# Patient Record
Sex: FEMALE | Race: ASIAN | NOT HISPANIC OR LATINO | URBAN - METROPOLITAN AREA
[De-identification: names, ages, dates, MRNs, and addresses within clinical notes are randomized per-mention and may not be internally consistent; named-entity substitution may affect disease eponyms.]

---

## 2017-12-30 ENCOUNTER — EMERGENCY (EMERGENCY)
Facility: HOSPITAL | Age: 57
LOS: 1 days | Discharge: HOME | End: 2017-12-30
Attending: EMERGENCY MEDICINE | Admitting: EMERGENCY MEDICINE
Payer: MEDICAID

## 2017-12-30 VITALS
DIASTOLIC BLOOD PRESSURE: 84 MMHG | WEIGHT: 154.98 LBS | RESPIRATION RATE: 16 BRPM | SYSTOLIC BLOOD PRESSURE: 157 MMHG | HEIGHT: 60 IN | OXYGEN SATURATION: 97 % | TEMPERATURE: 98 F | HEART RATE: 82 BPM

## 2017-12-30 DIAGNOSIS — M06.9 RHEUMATOID ARTHRITIS, UNSPECIFIED: ICD-10-CM

## 2017-12-30 DIAGNOSIS — J20.9 ACUTE BRONCHITIS, UNSPECIFIED: ICD-10-CM

## 2017-12-30 DIAGNOSIS — J22 UNSPECIFIED ACUTE LOWER RESPIRATORY INFECTION: ICD-10-CM

## 2017-12-30 LAB
ANION GAP SERPL CALC-SCNC: 7 MMOL/L — SIGNIFICANT CHANGE UP (ref 5–17)
BUN SERPL-MCNC: 7 MG/DL — SIGNIFICANT CHANGE UP (ref 7–23)
CALCIUM SERPL-MCNC: 8.6 MG/DL — SIGNIFICANT CHANGE UP (ref 8.5–10.1)
CHLORIDE SERPL-SCNC: 104 MMOL/L — SIGNIFICANT CHANGE UP (ref 96–108)
CO2 SERPL-SCNC: 27 MMOL/L — SIGNIFICANT CHANGE UP (ref 22–31)
CREAT SERPL-MCNC: 0.81 MG/DL — SIGNIFICANT CHANGE UP (ref 0.5–1.3)
CRP SERPL-MCNC: 2.5 MG/DL — HIGH (ref 0–0.4)
FLUAV H3 RNA SPEC QL NAA+PROBE: DETECTED
GLUCOSE SERPL-MCNC: 103 MG/DL — HIGH (ref 70–99)
HCT VFR BLD CALC: 29.9 % — LOW (ref 34.5–45)
HGB BLD-MCNC: 9.1 G/DL — LOW (ref 11.5–15.5)
MCHC RBC-ENTMCNC: 20.5 PG — LOW (ref 27–34)
MCHC RBC-ENTMCNC: 30.4 GM/DL — LOW (ref 32–36)
MCV RBC AUTO: 67.4 FL — LOW (ref 80–100)
PLATELET # BLD AUTO: 292 K/UL — SIGNIFICANT CHANGE UP (ref 150–400)
POTASSIUM SERPL-MCNC: 4 MMOL/L — SIGNIFICANT CHANGE UP (ref 3.5–5.3)
POTASSIUM SERPL-SCNC: 4 MMOL/L — SIGNIFICANT CHANGE UP (ref 3.5–5.3)
RAPID RVP RESULT: DETECTED
RBC # BLD: 4.44 M/UL — SIGNIFICANT CHANGE UP (ref 3.8–5.2)
RBC # FLD: 14.1 % — SIGNIFICANT CHANGE UP (ref 10.3–14.5)
SODIUM SERPL-SCNC: 138 MMOL/L — SIGNIFICANT CHANGE UP (ref 135–145)
WBC # BLD: 14 K/UL — HIGH (ref 3.8–10.5)
WBC # FLD AUTO: 14 K/UL — HIGH (ref 3.8–10.5)

## 2017-12-30 PROCEDURE — 87486 CHLMYD PNEUM DNA AMP PROBE: CPT

## 2017-12-30 PROCEDURE — 71046 X-RAY EXAM CHEST 2 VIEWS: CPT

## 2017-12-30 PROCEDURE — 71020: CPT | Mod: 26

## 2017-12-30 PROCEDURE — 85027 COMPLETE CBC AUTOMATED: CPT

## 2017-12-30 PROCEDURE — 94640 AIRWAY INHALATION TREATMENT: CPT

## 2017-12-30 PROCEDURE — 99285 EMERGENCY DEPT VISIT HI MDM: CPT

## 2017-12-30 PROCEDURE — 87581 M.PNEUMON DNA AMP PROBE: CPT

## 2017-12-30 PROCEDURE — 84145 PROCALCITONIN (PCT): CPT

## 2017-12-30 PROCEDURE — 87633 RESP VIRUS 12-25 TARGETS: CPT

## 2017-12-30 PROCEDURE — 86140 C-REACTIVE PROTEIN: CPT

## 2017-12-30 PROCEDURE — 80048 BASIC METABOLIC PNL TOTAL CA: CPT

## 2017-12-30 PROCEDURE — 36415 COLL VENOUS BLD VENIPUNCTURE: CPT

## 2017-12-30 PROCEDURE — 87798 DETECT AGENT NOS DNA AMP: CPT

## 2017-12-30 PROCEDURE — 99285 EMERGENCY DEPT VISIT HI MDM: CPT | Mod: 25

## 2017-12-30 RX ORDER — ALBUTEROL 90 UG/1
2 AEROSOL, METERED ORAL
Qty: 0 | Refills: 0 | COMMUNITY

## 2017-12-30 RX ORDER — ALBUTEROL 90 UG/1
2 AEROSOL, METERED ORAL
Qty: 1 | Refills: 0 | OUTPATIENT
Start: 2017-12-30 | End: 2018-01-03

## 2017-12-30 RX ORDER — SULFASALAZINE 500 MG
3 TABLET ORAL
Qty: 0 | Refills: 0 | COMMUNITY

## 2017-12-30 RX ORDER — ATORVASTATIN CALCIUM 80 MG/1
1 TABLET, FILM COATED ORAL
Qty: 0 | Refills: 0 | COMMUNITY

## 2017-12-30 RX ORDER — BUDESONIDE AND FORMOTEROL FUMARATE DIHYDRATE 160; 4.5 UG/1; UG/1
2 AEROSOL RESPIRATORY (INHALATION)
Qty: 0 | Refills: 0 | COMMUNITY

## 2017-12-30 RX ORDER — SULFASALAZINE 500 MG
6 TABLET ORAL
Qty: 0 | Refills: 0 | COMMUNITY

## 2017-12-30 RX ORDER — ALBUTEROL 90 UG/1
2.5 AEROSOL, METERED ORAL
Qty: 0 | Refills: 0 | Status: COMPLETED | OUTPATIENT
Start: 2017-12-30 | End: 2017-12-30

## 2017-12-30 RX ORDER — LISINOPRIL 2.5 MG/1
1 TABLET ORAL
Qty: 0 | Refills: 0 | COMMUNITY

## 2017-12-30 RX ADMIN — ALBUTEROL 2.5 MILLIGRAM(S): 90 AEROSOL, METERED ORAL at 09:56

## 2017-12-30 RX ADMIN — Medication 40 MILLIGRAM(S): at 12:09

## 2017-12-30 RX ADMIN — ALBUTEROL 2.5 MILLIGRAM(S): 90 AEROSOL, METERED ORAL at 10:22

## 2017-12-30 RX ADMIN — ALBUTEROL 2.5 MILLIGRAM(S): 90 AEROSOL, METERED ORAL at 10:13

## 2017-12-30 NOTE — ED ADULT TRIAGE NOTE - CHIEF COMPLAINT QUOTE
"I've had a dry cough for 2 weeks. I got back from [place] 2 days ago. I've had a fever of up to 102F and some pain in my chest when I cough"

## 2017-12-30 NOTE — ED PROVIDER NOTE - OBJECTIVE STATEMENT
56 yo female, visited [place] x few weeks and presents today with dark greenish/black sputum/cough x 2-weeks which began while there and which is still present today. No fever/chills/nausea/vomiting/diarrhea/SOB or chest pain.

## 2017-12-30 NOTE — CONSULT NOTE ADULT - PROBLEM SELECTOR RECOMMENDATION 2
acute bronchospasm related to LRTI  proventil PRN  Prednisone 40 mg PO daily x 5 days, then cont with 5 mg PO as per home regimen  Symbicort 160/4.5 mg BID inhaler  Mucinex BID  complete ABX therapy  will need follow up with PMD  reviewed LABS, CXR and Impression with pt and family

## 2017-12-30 NOTE — ED PROVIDER NOTE - PROGRESS NOTE DETAILS
Feels better at this time. Seen and evaluated by Dr. Kim Feels better at this time. Seen and evaluated by Dr. Kim. Patient aware of risks of steroids and quinolones including tendon rupture and agrees with plan and accepts risks

## 2017-12-30 NOTE — CONSULT NOTE ADULT - PROBLEM SELECTOR RECOMMENDATION 3
RA  home regimen  follow up with PMD  caution with Systemic Steroids and Quinolones, limit strenuous activity to avoid tendinopathy and or cartilaginous injuries

## 2017-12-30 NOTE — CONSULT NOTE ADULT - SUBJECTIVE AND OBJECTIVE BOX
Date/Time Patient Seen:  		  Referring MD:   Data Reviewed	       Patient is a 57y old  Female who presents with a chief complaint of sob, cough, wheezing    Subjective/HPI  in bed  seen and examined, vs and meds reviewed, cough and sob and wheezing reported, pt recently traveled from [place], now with lower resp tract infection symptoms  56 yo female, visited [place] x few weeks and presents today with dark greenish/black sputum/cough x 2-weeks which began while there and which is still present today. No fever/chills/nausea/vomiting/diarrhea/SOB or chest pain.    pt reports productive cough, green phlegm, fever    pt has RA, currently on regimen as per PMD and Rheum, was on Humira 5 months ago, maintained on low dose prednisone and sulfasalazine        PAST MEDICAL & SURGICAL HISTORY:  HLD (hyperlipidemia)  Rheumatoid arthritis  Hypertension  No significant past surgical history        Medication list         MEDICATIONS  (STANDING):  levoFLOXacin  Tablet 500 milliGRAM(s) Oral once  predniSONE   Tablet 40 milliGRAM(s) Oral Once    MEDICATIONS  (PRN):         Vitals log        ICU Vital Signs Last 24 Hrs  T(C): 36.8 (30 Dec 2017 09:27), Max: 36.8 (30 Dec 2017 09:27)  T(F): 98.3 (30 Dec 2017 09:27), Max: 98.3 (30 Dec 2017 09:27)  HR: 82 (30 Dec 2017 09:27) (82 - 82)  BP: 157/84 (30 Dec 2017 09:27) (157/84 - 157/84)  BP(mean): --  ABP: --  ABP(mean): --  RR: 16 (30 Dec 2017 09:27) (16 - 16)  SpO2: 97% (30 Dec 2017 09:27) (97% - 97%)           Input and Output:  I&O's Detail      Lab Data                        9.1    14.0  )-----------( 292      ( 30 Dec 2017 10:22 )             29.9     12-30    138  |  104  |  7   ----------------------------<  103<H>  4.0   |  27  |  0.81    Ca    8.6      30 Dec 2017 10:22    non smoker  non drinker  lives at home  recent travel reported to [facility] and sick contacts reported            Review of Systems	  cough  sob  wheezing  productive cough    Objective     Physical Examination    obese  head at  heart s1s2  lungs dec BS  wheezing  cn grossly int  moves all extr      Pertinent Lab findings & Imaging      Neumann:  NO   Adequate UO     I&O's Detail           Discussed with:     Cultures:	        Radiology    EXAM:  CHEST PA & LAT                            PROCEDURE DATE:  12/30/2017          INTERPRETATION:  Clinical information: Cough    No prior studies present for comparison    2 views, frontal and lateral.    The aorta shows atherosclerotic changes. The lungs are clear. There is no   sign of infiltrate effusion or congestive failure. Heart size is within   normal limits. Round appearance of the right pulmonary artery, correlate   with prior study or a follow-up exam to ensure stability. Mediastinal   contours, osseous structures intact.    The bony thorax appears intact.    IMPRESSION:    See above report.                PERCY GAMEZ M.D.,ATTENDING RADIOLOGIST  This document has been electronically signed. Dec 30 2017 10:32AM

## 2021-11-22 ENCOUNTER — OUT OF OFFICE VISIT (OUTPATIENT)
Dept: URBAN - METROPOLITAN AREA MEDICAL CENTER 12 | Facility: MEDICAL CENTER | Age: 61
End: 2021-11-22
Payer: COMMERCIAL

## 2021-11-22 DIAGNOSIS — I25.810 ATHEROSCLEROSIS OF CORONARY ARTERY BYPASS GRAFT(S) WITHOUT ANGINA PECTORIS: ICD-10-CM

## 2021-11-22 DIAGNOSIS — K59.09 CHANGE IN BOWEL MOVEMENTS INTERMITTENT CONSTIPATION. URGENCY IN THE MORNING.: ICD-10-CM

## 2021-11-22 PROCEDURE — 99232 SBSQ HOSP IP/OBS MODERATE 35: CPT | Performed by: STUDENT IN AN ORGANIZED HEALTH CARE EDUCATION/TRAINING PROGRAM

## 2021-11-22 PROCEDURE — G8427 DOCREV CUR MEDS BY ELIG CLIN: HCPCS | Performed by: STUDENT IN AN ORGANIZED HEALTH CARE EDUCATION/TRAINING PROGRAM

## 2021-11-22 PROCEDURE — 99222 1ST HOSP IP/OBS MODERATE 55: CPT | Performed by: STUDENT IN AN ORGANIZED HEALTH CARE EDUCATION/TRAINING PROGRAM

## 2022-02-08 NOTE — ED ADULT NURSE NOTE - TEMPLATE
Called patient told him message below and questioning why he needs to make another appointment again, while this is just the part 2 of  His surgery that was done last December 2021. Please advise, if patient needs to see doctor for pre-op, can we used \"res24\"   Please   Advise, Thank you.     Please reply to pool: EM CC IM FM ALG RHE Respiratory

## 2023-01-12 NOTE — ED PROVIDER NOTE - DISPOSITION TYPE
Received request via: Pharmacy    Was the patient seen in the last year in this department? Yes    Does the patient have an active prescription (recently filled or refills available) for medication(s) requested? No    Does the patient have retirement Plus and need 100 day supply (blood pressure, diabetes and cholesterol meds only)? Patient does not have SCP  
DISCHARGE

## 2025-08-06 ENCOUNTER — OFFICE VISIT (OUTPATIENT)
Dept: URBAN - METROPOLITAN AREA CLINIC 109 | Facility: CLINIC | Age: 65
End: 2025-08-06